# Patient Record
Sex: FEMALE | Race: BLACK OR AFRICAN AMERICAN | NOT HISPANIC OR LATINO | URBAN - METROPOLITAN AREA
[De-identification: names, ages, dates, MRNs, and addresses within clinical notes are randomized per-mention and may not be internally consistent; named-entity substitution may affect disease eponyms.]

---

## 2021-07-25 ENCOUNTER — EMERGENCY (EMERGENCY)
Facility: HOSPITAL | Age: 31
LOS: 1 days | Discharge: HOME | End: 2021-07-25
Attending: STUDENT IN AN ORGANIZED HEALTH CARE EDUCATION/TRAINING PROGRAM | Admitting: STUDENT IN AN ORGANIZED HEALTH CARE EDUCATION/TRAINING PROGRAM
Payer: MEDICAID

## 2021-07-25 VITALS
OXYGEN SATURATION: 100 % | HEART RATE: 97 BPM | SYSTOLIC BLOOD PRESSURE: 130 MMHG | RESPIRATION RATE: 18 BRPM | TEMPERATURE: 98 F | DIASTOLIC BLOOD PRESSURE: 86 MMHG

## 2021-07-25 VITALS
OXYGEN SATURATION: 100 % | RESPIRATION RATE: 18 BRPM | SYSTOLIC BLOOD PRESSURE: 151 MMHG | HEART RATE: 125 BPM | DIASTOLIC BLOOD PRESSURE: 91 MMHG

## 2021-07-25 LAB
ALBUMIN SERPL ELPH-MCNC: 3.8 G/DL — SIGNIFICANT CHANGE UP (ref 3.3–5)
ALP SERPL-CCNC: 50 U/L — SIGNIFICANT CHANGE UP (ref 40–120)
ALT FLD-CCNC: 11 U/L — SIGNIFICANT CHANGE UP (ref 4–33)
ANION GAP SERPL CALC-SCNC: 16 MMOL/L — HIGH (ref 7–14)
APPEARANCE UR: CLEAR — SIGNIFICANT CHANGE UP
AST SERPL-CCNC: 11 U/L — SIGNIFICANT CHANGE UP (ref 4–32)
BASOPHILS # BLD AUTO: 0.03 K/UL — SIGNIFICANT CHANGE UP (ref 0–0.2)
BASOPHILS NFR BLD AUTO: 0.4 % — SIGNIFICANT CHANGE UP (ref 0–2)
BILIRUB SERPL-MCNC: 0.3 MG/DL — SIGNIFICANT CHANGE UP (ref 0.2–1.2)
BILIRUB UR-MCNC: NEGATIVE — SIGNIFICANT CHANGE UP
BUN SERPL-MCNC: 8 MG/DL — SIGNIFICANT CHANGE UP (ref 7–23)
CALCIUM SERPL-MCNC: 9 MG/DL — SIGNIFICANT CHANGE UP (ref 8.4–10.5)
CHLORIDE SERPL-SCNC: 104 MMOL/L — SIGNIFICANT CHANGE UP (ref 98–107)
CO2 SERPL-SCNC: 15 MMOL/L — LOW (ref 22–31)
COLOR SPEC: SIGNIFICANT CHANGE UP
CREAT SERPL-MCNC: 0.81 MG/DL — SIGNIFICANT CHANGE UP (ref 0.5–1.3)
DIFF PNL FLD: NEGATIVE — SIGNIFICANT CHANGE UP
EOSINOPHIL # BLD AUTO: 0.06 K/UL — SIGNIFICANT CHANGE UP (ref 0–0.5)
EOSINOPHIL NFR BLD AUTO: 0.8 % — SIGNIFICANT CHANGE UP (ref 0–6)
GLUCOSE SERPL-MCNC: 83 MG/DL — SIGNIFICANT CHANGE UP (ref 70–99)
GLUCOSE UR QL: NEGATIVE — SIGNIFICANT CHANGE UP
HCG SERPL-ACNC: <5 MIU/ML — SIGNIFICANT CHANGE UP
HCG SERPL-ACNC: SIGNIFICANT CHANGE UP MIU/ML
HCT VFR BLD CALC: 39.7 % — SIGNIFICANT CHANGE UP (ref 34.5–45)
HGB BLD-MCNC: 12.9 G/DL — SIGNIFICANT CHANGE UP (ref 11.5–15.5)
IANC: 4.16 K/UL — SIGNIFICANT CHANGE UP (ref 1.5–8.5)
IMM GRANULOCYTES NFR BLD AUTO: 0.4 % — SIGNIFICANT CHANGE UP (ref 0–1.5)
KETONES UR-MCNC: ABNORMAL
LEUKOCYTE ESTERASE UR-ACNC: NEGATIVE — SIGNIFICANT CHANGE UP
LYMPHOCYTES # BLD AUTO: 2.21 K/UL — SIGNIFICANT CHANGE UP (ref 1–3.3)
LYMPHOCYTES # BLD AUTO: 31 % — SIGNIFICANT CHANGE UP (ref 13–44)
MCHC RBC-ENTMCNC: 24.9 PG — LOW (ref 27–34)
MCHC RBC-ENTMCNC: 32.5 GM/DL — SIGNIFICANT CHANGE UP (ref 32–36)
MCV RBC AUTO: 76.5 FL — LOW (ref 80–100)
MONOCYTES # BLD AUTO: 0.64 K/UL — SIGNIFICANT CHANGE UP (ref 0–0.9)
MONOCYTES NFR BLD AUTO: 9 % — SIGNIFICANT CHANGE UP (ref 2–14)
NEUTROPHILS # BLD AUTO: 4.16 K/UL — SIGNIFICANT CHANGE UP (ref 1.8–7.4)
NEUTROPHILS NFR BLD AUTO: 58.4 % — SIGNIFICANT CHANGE UP (ref 43–77)
NITRITE UR-MCNC: NEGATIVE — SIGNIFICANT CHANGE UP
NRBC # BLD: 0 /100 WBCS — SIGNIFICANT CHANGE UP
NRBC # FLD: 0 K/UL — SIGNIFICANT CHANGE UP
PH UR: 6.5 — SIGNIFICANT CHANGE UP (ref 5–8)
PLATELET # BLD AUTO: 375 K/UL — SIGNIFICANT CHANGE UP (ref 150–400)
POTASSIUM SERPL-MCNC: 3.7 MMOL/L — SIGNIFICANT CHANGE UP (ref 3.5–5.3)
POTASSIUM SERPL-SCNC: 3.7 MMOL/L — SIGNIFICANT CHANGE UP (ref 3.5–5.3)
PROT SERPL-MCNC: 6.8 G/DL — SIGNIFICANT CHANGE UP (ref 6–8.3)
PROT UR-MCNC: NEGATIVE — SIGNIFICANT CHANGE UP
RBC # BLD: 5.19 M/UL — SIGNIFICANT CHANGE UP (ref 3.8–5.2)
RBC # FLD: 13.8 % — SIGNIFICANT CHANGE UP (ref 10.3–14.5)
SODIUM SERPL-SCNC: 135 MMOL/L — SIGNIFICANT CHANGE UP (ref 135–145)
SP GR SPEC: 1.01 — LOW (ref 1.01–1.02)
UROBILINOGEN FLD QL: SIGNIFICANT CHANGE UP
WBC # BLD: 7.13 K/UL — SIGNIFICANT CHANGE UP (ref 3.8–10.5)
WBC # FLD AUTO: 7.13 K/UL — SIGNIFICANT CHANGE UP (ref 3.8–10.5)

## 2021-07-25 PROCEDURE — 76817 TRANSVAGINAL US OBSTETRIC: CPT | Mod: 26

## 2021-07-25 PROCEDURE — 99285 EMERGENCY DEPT VISIT HI MDM: CPT

## 2021-07-25 NOTE — ED ADULT TRIAGE NOTE - CHIEF COMPLAINT QUOTE
c/o mid abd pain since last night with episodes of diarrhea, denies fever. chills, repros hx fo anxiety and raid heart rate on metoprolol denies taking anticoagulants.

## 2021-07-25 NOTE — ED PROVIDER NOTE - CLINICAL SUMMARY MEDICAL DECISION MAKING FREE TEXT BOX
Eli-PGY3: 31 year old female with PMH anxiety, tachycardia (on metoprolol daily) presents with suprapubic cramping x 3 days. Pt states her LMP was July 16, states her periods are usually regular. Pt reports suprapubic cramping and urinary frequency x 3 days. Pt admits to occasional episodes of loose stools yesterday, but denies any fevers, chest pain, shortness of breath, bloody stools, black tarry stools, vaginal bleeding, vaginal discharge, or rash. Pt reports cramping similar to menstrual period, but states she has not had any vaginal bleeding. Concern for pregnancy as pt with late menses and suprapubic cramping, possible UTI. No peritoneal signs. Pt attributes tachycardia to anxiety, states she took metoprolol today, EKG confirming sinus rhythm. Will obtain labs r/o pregnancy r/o UTI and reassess with dispo accordingly.

## 2021-07-25 NOTE — ED PROVIDER NOTE - PATIENT PORTAL LINK FT
You can access the FollowMyHealth Patient Portal offered by [facility] by registering at the following website: [email or web address]. By joining [facility]’s FollowMyHealth portal, you will also be able to view your health information using other applications (apps) compatible with our system.

## 2021-07-25 NOTE — ED ADULT NURSE NOTE - OBJECTIVE STATEMENT
p/t is a 31y old female received awake and responsive, c.o of diffuse abd discomfort for few days, cardiac monitor is on, vss, labs drawn and sent as per MD order, no further c/o noted will continue to monitor

## 2021-07-25 NOTE — ED PROVIDER NOTE - OBJECTIVE STATEMENT
31 year old female with PMH anxiety, tachycardia (on metoprolol daily) presents with suprapubic cramping x 3 days. Pt states her LMP was July 16, states her periods are usually regular. Pt reports suprapubic cramping and urinary frequency x 3 days. Pt admits to occasional episodes of loose stools yesterday, but denies any fevers, chest pain, shortness of breath, bloody stools, black tarry stools, vaginal bleeding, vaginal discharge, or rash. Pt reports cramping similar to menstrual period, but states she has not had any vaginal bleeding. Denies any recent injury or trauma. Denies any additional complaints.

## 2021-07-25 NOTE — ED PROVIDER NOTE - NSFOLLOWUPINSTRUCTIONS_ED_ALL_ED_FT
You were seen for abdominal pain. You have pregnancy of unknown location. Please follow up with your obstetrician in 48 hours.     No signs of emergency medical condition on today's workup.  Your results are attached with your discharge instructions, please review them with your primary care physician. If there is a result pending, you will receive a call if test is positive.    A presumptive diagnosis is made today, but further evaluation may be required by your primary care doctor and/or specialist for a definitive diagnosis. Therefore, follow up as directed and if symptoms change/worsen or any emergency conditions, please return to the ER.    For pain or fever you can ibuprofen (motrin, advil) or tylenol as needed, as directed on packaging.    If needed, call patient access services at [phone] to find a primary care doctor, or call at [phone] to make an appointment at the clinic.      Abdominal Pain During Pregnancy       Belly (abdominal) pain is common during pregnancy. There are many possible causes. Most of the time, it is not a serious problem. Other times, it can be a sign that something is wrong with the pregnancy. Always tell your doctor if you have belly pain.      Follow these instructions at home:    • Do not have sex or put anything in your vagina until your pain goes away completely.      •Get plenty of rest until your pain gets better.      •Drink enough fluid to keep your pee (urine) pale yellow.      •Take over-the-counter and prescription medicines only as told by your doctor.      •Keep all follow-up visits as told by your doctor. This is important.        Contact a doctor if:    •Your pain continues or gets worse after resting.    •You have lower belly pain that:  •Comes and goes at regular times.    •Spreads to your back.    •Feels like menstrual cramps.      •You have pain or burning when you pee (urinate).    Get help right away if:    •You have a fever or chills.    •You have vaginal bleeding.    •You are leaking fluid from your vagina.    •You are passing tissue from your vagina.    •You throw up (vomit) for more than 24 hours.    •You have watery poop (diarrhea) for more than 24 hours.    •Your baby is moving less than usual.    •You feel very weak or faint.    •You have shortness of breath.    •You have very bad pain in your upper belly.    Summary    •Belly (abdominal) pain is common during pregnancy. There are many possible causes.    •If you have belly pain during pregnancy, tell your doctor right away.    •Keep all follow-up visits as told by your doctor. This is important.

## 2021-07-25 NOTE — ED PROVIDER NOTE - PHYSICAL EXAMINATION
CONSTITUTIONAL: non-toxic, well appearing  SKIN: no rash, no petechiae.  EYES: pink conjunctiva, anicteric  NECK: Supple; no meningismus, no JVD  CARD: RRR, no murmurs, equal radial pulses bilaterally 2+  RESP: CTAB, no respiratory distress  ABD: Soft, suprapubic tenderness with firm uterus, non-distended, no peritoneal signs, no CVA tenderness. Low transverse abdominoplasty scar present.   EXT: Normal ROM x4. No edema.   NEURO: Alert, oriented. Neuro exam nonfocal  PSYCH: Cooperative, appropriate.

## 2021-07-25 NOTE — ED PROVIDER NOTE - ATTENDING CONTRIBUTION TO CARE
32 yo f past medical history anxiety, tachydysrhythmia with lower abd cramping x 3 days. lmp 7/16/21.+ ucg in [place].  denies fever chills, n/v, abnormal urination or bms., vaginal bleeding or discharge. exam as above.  pregnancy of unknown location. plan: labs us, reassess.

## 2021-07-25 NOTE — ED PROVIDER NOTE - NSFOLLOWUPCLINICS_GEN_ALL_ED_FT
[facility] - [facility]  OB/GYN & Surg  [phone] [place] [address]  Phone: [phone]  Fax:

## 2021-07-25 NOTE — CONSULT NOTE ADULT - SUBJECTIVE AND OBJECTIVE BOX
GYN Consult Note      HPI:  31y  LMP  presents with 3 days of abdominal cramping. Pt states that she had a tummy tuck in [place] 4 months ago and her cousin passed recently so she has been under increased stressed. Pt denies fevers, headaches, CP, SOB, N/V, edema, vaginal bleeding.    OB/GYN HISTORY:   G1: eLTCS  [facility]    Last Menstrual Period: , regular. Stopped OCPs prior to abdominal surgery     Name of GYN Physician: [place]     PAST MEDICAL & SURGICAL HISTORY:  Tachycardia    Anxiety        REVIEW OF SYSTEMS  General: denies fevers, chills, tiredness  Skin/Breast: denies breast pain  Respiratory and Thorax: denies shortness of breath, denies cough  Cardiovascular: denies chest pain and denies palpitations  Gastrointestinal: denies abdominal pain, nausea/ vomiting	  Genitourinary: denies dysuria, increased urinary frequency, urgency	  Constitutional, Cardiovascular, Respiratory, Gastrointestinal, Genitourinary, Musculoskeletal and Integumentary review of systems are normal except as noted. 	    MEDICATIONS  (STANDING):    MEDICATIONS  (PRN):      Allergies    No Known Allergies    Intolerances        SOCIAL HISTORY:    FAMILY HISTORY:      Vital Signs Last 24 Hrs  T(C): 36.8 (2021 19:37), Max: 36.8 (2021 19:37)  T(F): 98.3 (2021 19:37), Max: 98.3 (2021 19:37)  HR: 97 (2021 19:37) (97 - 125)  BP: 130/86 (2021 19:37) (130/86 - 151/91)  BP(mean): --  RR: 18 (2021 19:37) (18 - 18)  SpO2: 100% (2021 19:37) (100% - 100%)    PHYSICAL EXAM:   Gen: NAD, alert and oriented x 3  Cardiovascular: regular   Respiratory: breathing comfortably on RA  Abd: soft, non tender, non-distended  Pelvic: closed/long, no CMT, Uterus: normal size, non tender  Adnexa: non tender, no palpable masses  Extremities: NTBL  Skin: warm and well perfused      LABS:                        12.9   7.13  )-----------( 375      ( 2021 15:45 )             39.7     07-25    135  |  104  |  8   ----------------------------<  83  3.7   |  15<L>  |  0.81    Ca    9.0      2021 15:46    TPro  6.8  /  Alb  3.8  /  TBili  0.3  /  DBili  x   /  AST  11  /  ALT  11  /  AlkPhos  50        Urinalysis Basic - ( 2021 15:23 )    Color: Light Yellow / Appearance: Clear / S.009 / pH: x  Gluc: x / Ketone: Small  / Bili: Negative / Urobili: <2 mg/dL   Blood: x / Protein: Negative / Nitrite: Negative   Leuk Esterase: Negative / RBC: x / WBC x   Sq Epi: x / Non Sq Epi: x / Bacteria: x        RADIOLOGY & ADDITIONAL STUDIES: GYN Consult Note    HPI:  31y  LMP  presents with 3 days of abdominal cramping. Pt states that she had a tummy tuck in [place] 4 months ago and her cousin passed recently so she has been under increased stressed. Pt denies fevers, headaches, CP, SOB, N/V, edema, vaginal bleeding.    OB/GYN HISTORY:   G1: eLTCS  [facility]    Last Menstrual Period: , regular. Stopped OCPs prior to abdominal surgery     Name of GYN Physician: [place]     PAST MEDICAL & SURGICAL HISTORY:  Tachycardia  Anxiety  Tummy tuck    REVIEW OF SYSTEMS  General: denies fevers, chills, tiredness  Skin/Breast: denies breast pain  Respiratory and Thorax: denies shortness of breath, denies cough  Cardiovascular: denies chest pain and denies palpitations  Gastrointestinal: +abdominal cramping, nausea/ vomiting	  Genitourinary: denies dysuria, increased urinary frequency, urgency	  Constitutional, Cardiovascular, Respiratory, Gastrointestinal, Genitourinary, Musculoskeletal and Integumentary review of systems are normal except as noted. 	    MEDICATIONS: Xanax, Metoprolol    Allergies  No Known Allergies    SOCIAL HISTORY: h/o anxiety        Vital Signs Last 24 Hrs  T(C): 36.8 (2021 19:37), Max: 36.8 (2021 19:37)  T(F): 98.3 (2021 19:37), Max: 98.3 (2021 19:37)  HR: 97 (2021 19:37) (97 - 125)  BP: 130/86 (2021 19:37) (130/86 - 151/91)  BP(mean): --  RR: 18 (2021 19:37) (18 - 18)  SpO2: 100% (2021 19:37) (100% - 100%)    PHYSICAL EXAM:   Gen: NAD, alert and oriented x 3  Cardiovascular: regular   Respiratory: breathing comfortably on RA  Abd: soft, non tender, non-distended  Pelvic: closed/long, no CMT, Uterus: normal size, non tender  Adnexa: non tender, no palpable masses  Extremities: NTBL  Skin: warm and well perfused      LABS:                        12.9   7.13  )-----------( 375      ( 2021 15:45 )             39.7         135  |  104  |  8   ----------------------------<  83  3.7   |  15<L>  |  0.81    Ca    9.0      2021 15:46    TPro  6.8  /  Alb  3.8  /  TBili  0.3  /  DBili  x   /  AST  11  /  ALT  11  /  AlkPhos  50        Urinalysis Basic - ( 2021 15:23 )    Color: Light Yellow / Appearance: Clear / S.009 / pH: x  Gluc: x / Ketone: Small  / Bili: Negative / Urobili: <2 mg/dL   Blood: x / Protein: Negative / Nitrite: Negative   Leuk Esterase: Negative / RBC: x / WBC x   Sq Epi: x / Non Sq Epi: x / Bacteria: x    HCG Quantitative, Serum (21 @ 18:28)    HCG Quantitative, Serum: 10862.0: For pregnancy evaluation the reference values are as follows:  Negative: <5 mIU/mL  Indeterminate: 5-25 mIU/mL (suggest repeat testing in 72hrs)  Positive: >25 mIU/mL  Note: hCG results of false positive and false negative for pregnancy are  rare, but can occur with this, and other hCG tests. Yumiko- and  post-menopausal females may have mildly elevated hCG concentrations,  usually less than 14 mIU/mL, that are constant over time.  Weeks of pregnancy:           mIU/mL  ------------------         -------          3                                6 -      71          4                              10 -     750          5                             220 -   7,100          6                             160 -  32,000          7                3,700 - 164,000          8                          32,000 - 150,000          9                          64,000 - 151,000         10                         47,000 - 187,000         12                         28,000 - 211,000         14                         14,000 -  63,000         15                         12,000 -  71,000         16 - 18                   8,000 -  58,000 mIU/mL          RADIOLOGY & ADDITIONAL STUDIES:    < from: US Transvaginal, OB (21 @ 16:26) >    EXAM:  US OB TRANSVAGINAL        PROCEDURE DATE:  2021         INTERPRETATION:  CLINICAL INFORMATION: Pelvic pain. Positive beta hCG.    LMP: 2021    Estimated Gestational Age by LMP: 5 weeks, 4 days    COMPARISON: None available.    Endovaginal and transabdominal pelvic sonogram. Color and Spectral Doppler was performed.    FINDINGS:  Uterus: Intrauterine sac, with no fetal pole or yolk sac present.    Gestational Sac Size (mean): 1.8 cm  Gestations Sac Shape : Normal.  Crown Rump Length: N/A  Estimated Gestational Age: 6 weeks, 5 days by mean gestational sac size.  Yolk Sac: N/A  Fetal Heart Rate: N/A    Right ovary: 2.9 cm x 2.9 cm x 1.8 cm. Within normal limits. Flow is demonstrated..  Left ovary: 3.3 cm x 2.1 cm x 1.6 cm. Within normal limits. Flow is demonstrated..    Fluid: None.    IMPRESSION:  A single intrauterine sac is identified without evidence of embryo, cardiac activity or yolk sac. Follow-up hCG and sonography is recommended to assess for IUP/viability and exclude ectopic pregnancy.      < end of copied text >

## 2021-07-25 NOTE — ED PROVIDER NOTE - NS ED ROS FT
Review of Systems    Constitutional: (-) fever, (-) chills, (-) fatigue  Cardiovascular: (-) chest pain, (-) syncope  Respiratory: (-) cough, (-) shortness of breath  Gastrointestinal: (-) vomiting, (-) diarrhea, (+) abdominal pain  Musculoskeletal: (-) neck pain, (-) back pain, (-) joint pain  Integumentary: (-) rash, (-) edema, (-) wound  Neurological: (-) headache, (-) altered mental status    Except as documented in the HPI, all other systems are negative.

## 2021-07-25 NOTE — CONSULT NOTE ADULT - ASSESSMENT
31y  LMP  presents with 3 days of abdominal cramping. Pt states that she had a tummy tuck in [place] 4 months ago and her cousin passed recently so she has been under increased stressed. VS stable, physical exam unremarkable, HCG 23,210 and US with an intrauterine sac. Differential includes threatened AB vs. ectopic pregnancy vs. viable IUP vs. spontaneous  in progress.  Difficult to assess at this time.      - Patient to follow up in 48 hours for repeat b-HCG with [place]  - No need for rhogam at this time.   - Ectopic precautions reviewed with patient.  Discussed with patient importance of follow up for b-HCG given unknown pregnancy location at this time.  Patient expressed understanding.  All questions and concerns addressed to patient's apparent satisfaction.   - Patient to be added to GYN b-HCG list for closer follow up.    - Patient stable for d/c home from GYN perspective.  Primary management per ED team.      d/w Dr. Doni Finley PGY2 31y  LMP 6/16 presents with 3 days of abdominal cramping. Pt states that she had a tummy tuck in [place] 4 months ago and her cousin passed recently so she has been under increased stressed. VS stable, physical exam unremarkable, HCG 23,210 and US with an intrauterine sac. Differential includes threatened AB vs. ectopic pregnancy vs. viable IUP vs. spontaneous  in progress.  Difficult to assess at this time.      - Patient to follow up in 48 hours for repeat b-HCG with [place]  - No need for rhogam at this time.   - Ectopic precautions reviewed with patient.  Discussed with patient importance of follow up for b-HCG given unknown pregnancy location at this time.  Patient expressed understanding.  All questions and concerns addressed to patient's apparent satisfaction.   - Patient to be added to GYN b-HCG list for closer follow up.    - Patient stable for d/c home from GYN perspective.  Primary management per ED team.      d/w Dr. Doni Finley PGY2    31y  LMP 6 presents with 3 days of abdominal cramping. Pt states that she had a tummy tuck in [place] 4 months ago and her cousin passed recently so she has been under increased stressed. VS stable, physical exam unremarkable, HCG 23,210 and US with an intrauterine sac. Differential includes threatened AB vs. ectopic pregnancy vs. viable IUP vs. spontaneous  in progress.  Difficult to assess at this time.      - Patient to follow up in 48 hours for repeat b-HCG with [place]  - No need for rhogam at this time.   - Ectopic precautions reviewed with patient.  Discussed with patient importance of follow up for b-HCG given unknown pregnancy location at this time.  Patient expressed understanding.  All questions and concerns addressed to patient's apparent satisfaction.   - Patient to be added to GYN b-HCG list for closer follow up.    - Patient stable for d/c home from GYN perspective.  Primary management per ED team.      d/w Dr. Doni Finley PGY2    30y/o  LMP 6/16 c/o 3 days of abdominal cramping. BhCG = 23,210, u/s with IU gestational sac but no fetal pole - likely failed IUP to f/u in 48 hours.  ROCK Marion M.D.

## 2021-07-25 NOTE — ED PROVIDER NOTE - PROGRESS NOTE DETAILS
Abdiel Oropeza,  PGY-3: pt received as a signout - rpt hcg is elevated. Obgyn recommends rpt hcg and US in 48hr. Pt has obgyn in [facility]. No new complains.

## 2021-07-26 NOTE — CHART NOTE - NSCHARTNOTEFT_GEN_A_CORE
On review of GYN beta list and patient chart, serum HCG on 7/25 x2 inconsistent. <5 7/25@1546, then repeat 90438 7/25@1828. Not addressed in any provider notes. Discussed discrepancy with lab, will attempt to find specimen and rerun sample. Awaiting results.    Jolene Howell, PGY4

## 2021-07-27 LAB
CULTURE RESULTS: SIGNIFICANT CHANGE UP
SPECIMEN SOURCE: SIGNIFICANT CHANGE UP

## 2021-07-28 NOTE — CHART NOTE - NSCHARTNOTEFT_GEN_A_CORE
30yo , LMP 6/16, who p/w cramping and determined to have a PUL on . Had two hCGs run on  w/ noted discrepancy of <5 and ~23,000. Upon re-run by lab, it was verbally confirmed that both samples had hCG values in the 22,000s.     1213: Called patient. No response. Voicemail with callback phone number given    Flaco Arguello  PGY-1, Obstetrics & Gynecology

## 2021-07-29 NOTE — CHART NOTE - NSCHARTNOTEFT_GEN_A_CORE
Called pt to ensure she was able to establish f/u care with an outside Ob/Gyn. Pt went to [facility] 7/28 where they everett a bHCG. Pt given Mife/Miso at that time. Will continue to f/u with [facility].     Debby Chin, PGY1

## 2023-06-10 NOTE — ED ADULT NURSE NOTE - EXTENSIONS OF SELF_ADULT
None
I will START or STAY ON the medications listed below when I get home from the hospital:    Blood pressure cuff  -- Please measure your blood pressure two times a day. Call your doctor if your blood pressure is greater than or equal to 140 systolic (top number) or 90 diastolic (bottom number). Come to the hospital if your blood pressure is 160/110 or greater, or if you experience a headache unrelieved by OTC medications, blurred vision, or difficulty breathing.  -- Indication: For Gestational HTN    ibuprofen 600 mg oral tablet  -- 1 tab(s) by mouth every 6 hours  -- Indication: For pain    acetaminophen 325 mg oral tablet  -- 3 tab(s) by mouth every 6 hours  -- Indication: For pain    Prenatal Multivitamins with Folic Acid 1 mg oral tablet  -- 1 tab(s) by mouth once a day  -- Indication: For supplementation